# Patient Record
Sex: MALE | Race: WHITE | Employment: FULL TIME | URBAN - METROPOLITAN AREA
[De-identification: names, ages, dates, MRNs, and addresses within clinical notes are randomized per-mention and may not be internally consistent; named-entity substitution may affect disease eponyms.]

---

## 2017-05-01 ENCOUNTER — INITIAL CONSULT (OUTPATIENT)
Dept: SURGERY | Age: 37
End: 2017-05-01

## 2017-05-01 ENCOUNTER — OFFICE VISIT (OUTPATIENT)
Dept: FAMILY MEDICINE CLINIC | Age: 37
End: 2017-05-01

## 2017-05-01 VITALS
SYSTOLIC BLOOD PRESSURE: 132 MMHG | BODY MASS INDEX: 41.75 KG/M2 | TEMPERATURE: 98.7 F | HEIGHT: 73 IN | WEIGHT: 315 LBS | DIASTOLIC BLOOD PRESSURE: 62 MMHG

## 2017-05-01 VITALS
BODY MASS INDEX: 41.75 KG/M2 | HEIGHT: 73 IN | SYSTOLIC BLOOD PRESSURE: 138 MMHG | WEIGHT: 315 LBS | DIASTOLIC BLOOD PRESSURE: 72 MMHG

## 2017-05-01 DIAGNOSIS — L02.412 ABSCESS OF AXILLA, LEFT: Primary | ICD-10-CM

## 2017-05-01 DIAGNOSIS — L02.213 CHEST WALL ABSCESS: Primary | ICD-10-CM

## 2017-05-01 PROCEDURE — 99242 OFF/OP CONSLTJ NEW/EST SF 20: CPT | Performed by: SURGERY

## 2017-05-01 PROCEDURE — 99213 OFFICE O/P EST LOW 20 MIN: CPT | Performed by: FAMILY MEDICINE

## 2017-05-01 RX ORDER — IBUPROFEN 200 MG
200 TABLET ORAL EVERY 6 HOURS PRN
COMMUNITY
End: 2020-11-08

## 2017-05-01 RX ORDER — FOLIC ACID 0.8 MG
1 TABLET ORAL EVERY OTHER DAY
COMMUNITY
End: 2020-11-08

## 2017-05-01 RX ORDER — CLINDAMYCIN HYDROCHLORIDE 300 MG/1
300 CAPSULE ORAL 3 TIMES DAILY
Qty: 30 CAPSULE | Refills: 0 | Status: SHIPPED | OUTPATIENT
Start: 2017-05-01 | End: 2017-05-11

## 2017-05-01 RX ORDER — AMOXICILLIN AND CLAVULANATE POTASSIUM 875; 125 MG/1; MG/1
1 TABLET, FILM COATED ORAL 2 TIMES DAILY
Qty: 20 TABLET | Refills: 0 | Status: SHIPPED | OUTPATIENT
Start: 2017-05-01 | End: 2017-05-11

## 2017-05-01 RX ORDER — OXYCODONE HYDROCHLORIDE 5 MG/1
TABLET ORAL
Qty: 25 TABLET | Refills: 0 | Status: SHIPPED | OUTPATIENT
Start: 2017-05-01 | End: 2017-09-28 | Stop reason: ALTCHOICE

## 2017-09-28 ENCOUNTER — OFFICE VISIT (OUTPATIENT)
Dept: FAMILY MEDICINE CLINIC | Age: 37
End: 2017-09-28

## 2017-09-28 VITALS
DIASTOLIC BLOOD PRESSURE: 60 MMHG | HEART RATE: 78 BPM | BODY MASS INDEX: 41.75 KG/M2 | TEMPERATURE: 99.2 F | WEIGHT: 315 LBS | SYSTOLIC BLOOD PRESSURE: 120 MMHG | HEIGHT: 73 IN | OXYGEN SATURATION: 99 %

## 2017-09-28 DIAGNOSIS — R30.0 DYSURIA: ICD-10-CM

## 2017-09-28 DIAGNOSIS — N41.0 ACUTE PROSTATITIS: Primary | ICD-10-CM

## 2017-09-28 LAB
BILIRUBIN, POC: ABNORMAL
BLOOD URINE, POC: ABNORMAL
CLARITY, POC: ABNORMAL
COLOR, POC: YELLOW
GLUCOSE URINE, POC: ABNORMAL
KETONES, POC: ABNORMAL
LEUKOCYTE EST, POC: ABNORMAL
NITRITE, POC: ABNORMAL
PH, POC: 6
PROTEIN, POC: 6
SPECIFIC GRAVITY, POC: 1.01
UROBILINOGEN, POC: 0.2

## 2017-09-28 PROCEDURE — 81002 URINALYSIS NONAUTO W/O SCOPE: CPT | Performed by: FAMILY MEDICINE

## 2017-09-28 PROCEDURE — 99213 OFFICE O/P EST LOW 20 MIN: CPT | Performed by: FAMILY MEDICINE

## 2017-09-28 RX ORDER — SULFAMETHOXAZOLE AND TRIMETHOPRIM 800; 160 MG/1; MG/1
1 TABLET ORAL 2 TIMES DAILY
Qty: 30 TABLET | Refills: 0 | Status: SHIPPED | OUTPATIENT
Start: 2017-09-28 | End: 2017-10-13

## 2017-09-28 ASSESSMENT — ENCOUNTER SYMPTOMS
SORE THROAT: 0
RESPIRATORY NEGATIVE: 1
GASTROINTESTINAL NEGATIVE: 1

## 2017-09-30 LAB — URINE CULTURE, ROUTINE: NORMAL

## 2020-11-08 ENCOUNTER — HOSPITAL ENCOUNTER (EMERGENCY)
Age: 40
Discharge: HOME | End: 2020-11-08
Attending: STUDENT IN AN ORGANIZED HEALTH CARE EDUCATION/TRAINING PROGRAM
Payer: COMMERCIAL

## 2020-11-08 VITALS
HEART RATE: 103 BPM | TEMPERATURE: 98.5 F | WEIGHT: 315 LBS | RESPIRATION RATE: 20 BRPM | DIASTOLIC BLOOD PRESSURE: 94 MMHG | BODY MASS INDEX: 40.43 KG/M2 | SYSTOLIC BLOOD PRESSURE: 148 MMHG | OXYGEN SATURATION: 99 % | HEIGHT: 74 IN

## 2020-11-08 PROCEDURE — 6370000000 HC RX 637 (ALT 250 FOR IP): Performed by: PHYSICIAN ASSISTANT

## 2020-11-08 PROCEDURE — 99283 EMERGENCY DEPT VISIT LOW MDM: CPT

## 2020-11-08 PROCEDURE — 99284 EMERGENCY DEPT VISIT MOD MDM: CPT

## 2020-11-08 RX ORDER — BACITRACIN, NEOMYCIN, POLYMYXIN B 400; 3.5; 5 [USP'U]/G; MG/G; [USP'U]/G
OINTMENT TOPICAL 2 TIMES DAILY
Status: DISCONTINUED | OUTPATIENT
Start: 2020-11-08 | End: 2020-11-08 | Stop reason: HOSPADM

## 2020-11-08 RX ADMIN — POLYMYXIN B SULFATE, BACITRACIN ZINC, NEOMYCIN SULFATE: 5000; 3.5; 4 OINTMENT TOPICAL at 13:18

## 2020-11-08 RX ADMIN — POLYMYXIN B SULFATE, BACITRACIN ZINC, NEOMYCIN SULFATE: 5000; 3.5; 4 OINTMENT TOPICAL at 13:05

## 2020-11-08 NOTE — ED PROVIDER NOTES
I independently examined and evaluated Tameka Pichardo. In brief, patient is a 81-year-old male with varicose veins, presents with concerns that he had a bleeding vein earlier that is now protruding from his leg. He hit his leg accidentally on something and caused the bleed and then noticed that the vein was protruding and came to the ER for further evaluation. He denies any associated pain. It is no longer bleeding at this time. He denies any other complaints or concerns. Focused exam revealed right lower extremity on the medial aspect of the calf has a small skin tear with protruding varicose vein. There is no surrounding evidence of infection or erythema. It is nontender. ED course: Patient presents with concerns for skin tear with protruding varicose vein in his right lower extremity. HPI detailed above. Upon arrival in the ED, vitals reassuring. Physical exam as detailed above. He does have a small protruding varicose vein in his right lower extremity. There is no way for us to put it back in at this point, it is nontender and it is a superficial vein. This was discussed with the patient and that the swelling will likely need to go down and that it should heal.  He was advised to apply bacitracin dressing on it and to keep it clean, as well as he is in Ace wrap for compression until it decreases in size enough that it stops protruding. He is to have a PCP so I will set him up with a primary care provider for follow-up. He was comfortable in agreement with plan of care. He is given return precautions for the ED and advised to monitor for any signs of infection. He was discharged. All diagnostic, treatment, and disposition decisions were made by myself in conjunction with the advanced practice provider. For all further details of the patient's emergency department visit, please see the advanced practice provider's documentation.     Comment: Please note this report has been produced using speech recognition software and may contain errors related to that system including errors in grammar, punctuation, and spelling, as well as words and phrases that may be inappropriate. If there are any questions or concerns please feel free to contact the dictating provider for clarification.         Jaqueline Kleni MD  11/08/20 1980

## 2020-11-08 NOTE — ED PROVIDER NOTES
[address] ED  EMERGENCY DEPARTMENT ENCOUNTER        Pt Name: Lexi Anderson  MRN: [id number]  [place] 1980  Date of evaluation: 11/8/2020  Provider: Rakel Gloria PA-C  PCP: No primary care provider on file. I have seen and evaluated this patient with my supervising physician Dr. Lanetet Joseph. CHIEF COMPLAINT       Chief Complaint   Patient presents with    Leg Problem     right leg, hit right leg vein with (nonrunning) chain saw states \" my vein is outside of my leg\"       HISTORY OF PRESENT ILLNESS   (Location, Timing/Onset, Context/Setting, Quality, Duration, Modifying Factors, Severity, Associated Signs and Symptoms)  Note limiting factors. Lexi Anderson is a 46 y.o. male brought in today by private vehicle for complaints of accidentally bumping his right knee with a chainsaw which was not running and when he bumped his right knee he accidentally bumped his varicose vein. He states that the vein is now exposed. Bleeding controlled. He is not on any blood thinners. He states he has had this happen before and he is applied Steri-Strips to the vein. Patient was concerned initially and decided to be evaluated here in the ED. Onset occurred just 30 minutes prior to arrival.  Duration of symptoms have been persistent since onset. Context includes bumping his right knee causing varicose vein to become exposed. No aggravating or alleviating complaints. He otherwise denies any other concerns. Nothing seems to make symptoms better or worse. Denies any other complaints. Denies pain to the right knee/leg. Nursing Notes were all reviewed and agreed with or any disagreements were addressed in the HPI. REVIEW OF SYSTEMS    (2-9 systems for level 4, 10 or more for level 5)     Review of Systems   Constitutional: Negative. Musculoskeletal: Negative. Skin: Positive for wound. + varicose vein injury    Neurological: Negative.     Hematological: Negative. Positives and Pertinent negatives as per HPI. Except as noted above in the ROS, all other systems were reviewed and negative. PAST MEDICAL HISTORY     Past Medical History:   Diagnosis Date    Morbid obesity ([address]     Wears glasses          SURGICAL HISTORY     Past Surgical History:   Procedure Laterality Date    DILATATION, ESOPHAGUS      GASTRECTOMY N/A 4/23/15    LAPAROSCOPIC SLEEVE GASTRECTOMY           HIP SURGERY Left     pinning as child    UPPER GASTROINTESTINAL ENDOSCOPY           CURRENTMEDICATIONS       Discharge Medication List as of 11/8/2020  1:42 PM            ALLERGIES     Niacin and related    FAMILYHISTORY     No family history on file. SOCIAL HISTORY       Social History     Tobacco Use    Smoking status: Never Smoker    Smokeless tobacco: Current User     Types: Chew    Tobacco comment: information in AVS   Substance Use Topics    Alcohol use: Yes     Comment: occasionally    Drug use: No       SCREENINGS    Anastasiia Coma Scale  Eye Opening: Spontaneous  Best Verbal Response: Oriented  Best Motor Response: Obeys commands  Anastasiia Coma Scale Score: 15        PHYSICAL EXAM    (up to 7 for level 4, 8 or more for level 5)     ED Triage Vitals [11/08/20 1231]   BP Temp Temp Source Pulse Resp SpO2 Height Weight   (!) 148/94 98.5 °F (36.9 °C) Oral 103 20 99 % 6' 2\" (1.88 m) (!) 400 lb (181.4 kg)       Physical Exam  Vitals signs and nursing note reviewed. Constitutional:       Appearance: He is well-developed. He is not diaphoretic. HENT:      Head: Normocephalic and atraumatic. Nose: Nose normal.   Eyes:      General:         Right eye: No discharge. Left eye: No discharge. Neck:      Musculoskeletal: Normal range of motion and neck supple. Cardiovascular:      Rate and Rhythm: Normal rate and regular rhythm. Heart sounds: Normal heart sounds. No murmur. No gallop.     Pulmonary:      Effort: Pulmonary effort is normal. No respiratory Plan at this time will be to apply bacitracin as well as a moist dressing and Ace wrap. Patient told to keep it clean and covered at home. Patient given follow-up to his PCP. We did give an urgent PCP follow-up. Patient also requested a vascular consult. We will also give him a follow-up for vascular. Patient told return immediately to the ED with any new or worsening symptoms including not limited to increased pain, swelling redness swelling or uncontrolled bleeding or any new or concerning symptoms. Patient verbalized understanding this plan was comfortable and stable at time of discharge. I did feel comfortable sending this patient home with close follow-up instructions and strict return precautions. Patient stable at time of discharge. FINAL IMPRESSION      1.  Varicose veins of left lower extremity, unspecified whether complicated          DISPOSITION/PLAN   DISPOSITION Decision To Discharge 11/08/2020 01:40:26 PM      PATIENT REFERREDTO:  Mendy Thomas MD  [address] [place] [address]  [phone]    Schedule an appointment as soon as possible for a visit in 1 day  As needed, If symptoms worsen    [facility] ED  [address] [place] [address]  Schedule an appointment as soon as possible for a visit   As needed, If symptoms worsen    [facility]) Pre-Services  [phone]          DISCHARGE MEDICATIONS:  Discharge Medication List as of 11/8/2020  1:42 PM          DISCONTINUED MEDICATIONS:  Discharge Medication List as of 11/8/2020  1:42 PM      STOP taking these medications       Ascorbic Acid (VITAMIN C) 500 MG CHEW Comments:   Reason for Stopping:         Magnesium 500 MG CAPS Comments:   Reason for Stopping:         ibuprofen (ADVIL;MOTRIN) 200 MG tablet Comments:   Reason for Stopping:                      (Please note that portions of this note were completed with a voice recognition program.  Efforts were made to edit the dictations but occasionally words are mis-transcribed.)    Brayan Latif PA-C (electronically signed)            Brayan Latif PA-C  11/10/20 [id number]